# Patient Record
Sex: FEMALE | Race: OTHER | NOT HISPANIC OR LATINO | URBAN - NONMETROPOLITAN AREA
[De-identification: names, ages, dates, MRNs, and addresses within clinical notes are randomized per-mention and may not be internally consistent; named-entity substitution may affect disease eponyms.]

---

## 2019-03-11 NOTE — PATIENT DISCUSSION
EPIPHORA WITH CLEARED NLDO, OD: WITH NASOLACRIMAL DUCT OBSTRUCTION AS NOTED ON PUNCTAL DILATION AND IRRIGATION. OBSTRUCTION CLEARED WITH IRRIGATION. PATENT OS. PRESCRIBED ARTIFICIAL TEARS. SAMPLE OF INVELTYS GIVEN TO USE BID OU X 2 WEEKS THEN OK TO USE PRN. RETURN FOR FOLLOW UP AS SCHEDULED OR SOONER IF SYMPTOMS WORSEN.

## 2019-04-03 ENCOUNTER — IMPORTED ENCOUNTER (OUTPATIENT)
Dept: URBAN - NONMETROPOLITAN AREA CLINIC 1 | Facility: CLINIC | Age: 74
End: 2019-04-03

## 2019-04-03 PROBLEM — H52.4: Noted: 2019-04-03

## 2019-04-03 PROBLEM — H26.493: Noted: 2019-04-03

## 2019-04-03 PROBLEM — E11.9: Noted: 2017-04-20

## 2019-04-03 PROBLEM — Z96.1: Noted: 2017-04-20

## 2019-04-03 PROCEDURE — 92014 COMPRE OPH EXAM EST PT 1/>: CPT

## 2019-04-03 PROCEDURE — 92015 DETERMINE REFRACTIVE STATE: CPT

## 2019-04-03 NOTE — PATIENT DISCUSSION
NIDDM sans Retinopathy Discussed diagnosis with patientDiscussed the risk of diabetic damage of the retina with potential vision loss and the importance of routine follow-upEmphasized strict blood sugar controlContinue to monitorPseudophakia with moderatpe CO OUBoth intraocular implants in place. Visually significant PCO. Discussed treatment options. Recommend YAG Eval with Dr. Cristiane Mustafa; patient elects to schedule eval.Continue to monitorPresbyopia OUDiscussed refractive status in detail with patientNew glasses Rx given today

## 2019-05-02 ENCOUNTER — IMPORTED ENCOUNTER (OUTPATIENT)
Dept: URBAN - NONMETROPOLITAN AREA CLINIC 1 | Facility: CLINIC | Age: 74
End: 2019-05-02

## 2019-05-02 PROBLEM — H26.493: Noted: 2019-05-02

## 2019-05-02 PROBLEM — E11.9: Noted: 2019-05-02

## 2019-05-02 PROBLEM — Z96.1: Noted: 2019-05-02

## 2019-05-02 PROCEDURE — 66821 AFTER CATARACT LASER SURGERY: CPT

## 2019-05-02 NOTE — PATIENT DISCUSSION
PCO OU- Doing well following the previous phacoemulsification cataract extraction and posterior chamber intraocular lens implantation OU with reduction in vision due to opacification of the posterior capsule OU YAG laser posterior capsulotomy OS was performed today without difficulty and the patient was scheduled to return in one to two weeks for +YAG Caps ODNIDDM s  OU-Stressed the importance of keeping blood sugars under control blood pressure under control and weight normalization and regular visits with PCP. -Explained the possible effects of poorly controlled diabetes and the damage that diabetes can cause to ocular health. -Patient to check HgbA1C.-Pt instructed to contact our office with any vision changes.

## 2019-05-22 ENCOUNTER — IMPORTED ENCOUNTER (OUTPATIENT)
Dept: URBAN - NONMETROPOLITAN AREA CLINIC 1 | Facility: CLINIC | Age: 74
End: 2019-05-22

## 2019-05-22 PROCEDURE — 66821 AFTER CATARACT LASER SURGERY: CPT

## 2019-06-05 ENCOUNTER — IMPORTED ENCOUNTER (OUTPATIENT)
Dept: URBAN - NONMETROPOLITAN AREA CLINIC 1 | Facility: CLINIC | Age: 74
End: 2019-06-05

## 2019-06-05 PROBLEM — Z96.1: Noted: 2019-05-02

## 2019-06-05 PROBLEM — Z98.890: Noted: 2019-06-05

## 2019-06-05 PROBLEM — E11.9: Noted: 2019-05-02

## 2019-06-05 PROCEDURE — 99024 POSTOP FOLLOW-UP VISIT: CPT

## 2019-06-05 NOTE — PATIENT DISCUSSION
Pseudophakia OU s/p YAG Caps OU - Doing well no further treatment indicated. NIDDM s DR THORPE-Stressed the importance of keeping blood sugars under control blood pressure under control and weight normalization and regular visits with PCP. -Explained the possible effects of poorly controlled diabetes and the damage that diabetes can cause to ocular health. -Patient to check HgbA1C.-Pt instructed to contact our office with any vision changes.

## 2020-07-24 NOTE — PATIENT DISCUSSION
GLAUCOMA SUSPECT, OU : INCREASED CUP/DISC RATIO. MILD RNFL CHANGES, LOW THRESHOLD FOR TX BUT GOOD IOP. NO FAMILY HISTORY. DISCUSSED VF 24-2 RNFL.  REPEAT VF AT FU

## 2020-07-24 NOTE — PATIENT DISCUSSION
EPIPHORA OD - RECURRENT NLDO. DISCUSSED OPTIONS OF SURGICAL TX VS WATCHING. PT DECLINES INTERVENTION AT THIS TIME.

## 2021-01-25 NOTE — PATIENT DISCUSSION
GLAUCOMA SUSPECT, OU : INCREASED CUP/DISC RATIO. MILD RNFL CHANGES, LOW THRESHOLD FOR TX BUT GOOD IOP. NO FAMILY HISTORY. DISCUSSED VF AND OCT MAC.

## 2022-04-10 ASSESSMENT — TONOMETRY
OD_IOP_MMHG: 14
OS_IOP_MMHG: 14
OD_IOP_MMHG: 14
OS_IOP_MMHG: 14
OS_IOP_MMHG: 12
OD_IOP_MMHG: 15
OS_IOP_MMHG: 14
OD_IOP_MMHG: 14

## 2022-04-10 ASSESSMENT — VISUAL ACUITY
OS_SC: 20/20-2
OD_SC: 20/40-
OD_SC: 20/40-
OS_SC: 20/100
OD_SC: 20/20-1
OS_SC: 20/25-2

## 2022-06-01 ENCOUNTER — ESTABLISHED PATIENT (OUTPATIENT)
Dept: RURAL CLINIC 3 | Facility: CLINIC | Age: 77
End: 2022-06-01

## 2022-06-01 DIAGNOSIS — H52.4: ICD-10-CM

## 2022-06-01 DIAGNOSIS — E11.9: ICD-10-CM

## 2022-06-01 PROCEDURE — 99214 OFFICE O/P EST MOD 30 MIN: CPT

## 2022-06-01 PROCEDURE — 92015 DETERMINE REFRACTIVE STATE: CPT

## 2022-06-01 PROCEDURE — 92250 FUNDUS PHOTOGRAPHY W/I&R: CPT

## 2022-06-01 ASSESSMENT — VISUAL ACUITY
OS_CC: 20/25-2
OD_CC: 20/30

## 2022-06-01 ASSESSMENT — TONOMETRY
OD_IOP_MMHG: 16
OS_IOP_MMHG: 17

## 2023-12-06 ENCOUNTER — FOLLOW UP (OUTPATIENT)
Dept: RURAL CLINIC 3 | Facility: CLINIC | Age: 78
End: 2023-12-06

## 2023-12-06 DIAGNOSIS — E11.9: ICD-10-CM

## 2023-12-06 DIAGNOSIS — H52.4: ICD-10-CM

## 2023-12-06 PROCEDURE — 92250 FUNDUS PHOTOGRAPHY W/I&R: CPT

## 2023-12-06 PROCEDURE — 92014 COMPRE OPH EXAM EST PT 1/>: CPT

## 2023-12-06 PROCEDURE — 92015 DETERMINE REFRACTIVE STATE: CPT

## 2023-12-06 ASSESSMENT — TONOMETRY
OD_IOP_MMHG: 12
OS_IOP_MMHG: 12

## 2023-12-06 ASSESSMENT — VISUAL ACUITY
OS_CC: 20/20
OD_CC: 20/20-1